# Patient Record
Sex: FEMALE | Race: WHITE | Employment: FULL TIME | URBAN - METROPOLITAN AREA
[De-identification: names, ages, dates, MRNs, and addresses within clinical notes are randomized per-mention and may not be internally consistent; named-entity substitution may affect disease eponyms.]

---

## 2022-10-20 ENCOUNTER — HOSPITAL ENCOUNTER (EMERGENCY)
Age: 62
Discharge: HOME | End: 2022-10-20
Attending: EMERGENCY MEDICINE
Payer: COMMERCIAL

## 2022-10-20 VITALS
TEMPERATURE: 98.1 F | DIASTOLIC BLOOD PRESSURE: 77 MMHG | HEIGHT: 66 IN | WEIGHT: 134.04 LBS | RESPIRATION RATE: 18 BRPM | HEART RATE: 62 BPM | BODY MASS INDEX: 21.54 KG/M2 | SYSTOLIC BLOOD PRESSURE: 159 MMHG | OXYGEN SATURATION: 99 %

## 2022-10-20 DIAGNOSIS — S46.211A RUPTURE OF RIGHT BICEPS TENDON, INITIAL ENCOUNTER: Primary | ICD-10-CM

## 2022-10-20 PROCEDURE — 99283 EMERGENCY DEPT VISIT LOW MDM: CPT

## 2022-10-20 RX ORDER — ACETAMINOPHEN 500 MG
1000 TABLET ORAL
Qty: 20 TABLET | Refills: 0 | Status: SHIPPED | OUTPATIENT
Start: 2022-10-20

## 2022-10-21 NOTE — ED PROVIDER NOTES
The history is provided by the patient. Shoulder Injury   The incident occurred yesterday. The incident occurred at home. Injury mechanism: moved her arm in the bath and felt a pop then had selling and bruising. The right shoulder is affected. The pain is moderate. The pain has been Constant since onset. There is A history of shoulder injury (rotator cuff pathology previously). She has No other injuries. Past Medical History:   Diagnosis Date    Depression     Heart murmur     Hernia     Stress        Past Surgical History:   Procedure Laterality Date    HX GYN  1987    c section    HX GYN  1988    tubal ligation    HX OTHER SURGICAL  10/18/13    femerol hernia repair by         Family History:   Problem Relation Age of Onset    Hypertension Mother     Diabetes Father     Heart Disease Father     Hypertension Father     Cancer Sister     Hypertension Brother     Heart Disease Brother        Social History     Socioeconomic History    Marital status:      Spouse name: Not on file    Number of children: Not on file    Years of education: Not on file    Highest education level: Not on file   Occupational History    Not on file   Tobacco Use    Smoking status: Every Day     Packs/day: 1.00     Types: Cigarettes    Smokeless tobacco: Not on file   Substance and Sexual Activity    Alcohol use: Yes     Comment: occasional    Drug use: Not on file    Sexual activity: Not on file   Other Topics Concern    Not on file   Social History Narrative    Not on file     Social Determinants of Health     Financial Resource Strain: Not on file   Food Insecurity: Not on file   Transportation Needs: Not on file   Physical Activity: Not on file   Stress: Not on file   Social Connections: Not on file   Intimate Partner Violence: Not on file   Housing Stability: Not on file         ALLERGIES: Patient has no known allergies. Review of Systems   All other systems reviewed and are negative.     Vitals:    10/20/22 2226 BP: (!) 159/77   Pulse: 62   Resp: 18   Temp: 98.1 °F (36.7 °C)   SpO2: 99%   Weight: 60.8 kg (134 lb 0.6 oz)   Height: 5' 6\" (1.676 m)            Physical Exam  Vitals and nursing note reviewed. Constitutional:       General: She is not in acute distress. Appearance: She is well-developed. HENT:      Head: Normocephalic and atraumatic. Eyes:      Conjunctiva/sclera: Conjunctivae normal.   Cardiovascular:      Rate and Rhythm: Normal rate and regular rhythm. Pulmonary:      Effort: Pulmonary effort is normal. No respiratory distress. Abdominal:      General: There is no distension. Musculoskeletal:         General: No deformity. Normal range of motion. Cervical back: Neck supple. Comments: Proximal biceps defect on right with a divot and balling up of muscle distally. Large area of ecchymosis and swelling over right upper medial arm   Skin:     General: Skin is warm and dry. Neurological:      Mental Status: She is alert. Cranial Nerves: No cranial nerve deficit. Psychiatric:         Behavior: Behavior normal.        MDM       65 y.o. female presents with right arm swelling and bruising with evidence of right biceps tendon rupture proximally. She has had shoulder issues before. She is anticoagulated for atrial fibrillation and this does not appear consistent with thrombosis. Bedside ultrasound confirms tendon defect with free fluid in the sheath. Placed in sling for comfort and supportive care measures until follow up with orthopedics to consider surgical intervention.      Procedures

## 2022-10-21 NOTE — ED NOTES
Pt discharged in stable condition at this time. MD/KAUSHIK reviewed discharge instructions, prescriptions, and follow up with patient at bedside. Pt verbalized understanding and denies any needs or questions at this time. Pt NAD ambulatory on DC with sling. MD bedside ultrasound to verify no clots.

## 2022-10-21 NOTE — ED TRIAGE NOTES
Emergency Room Nursing Note        Patient Name: Sofia Page      : 1960             MRN: [id number]      Chief Complaint:  Skin Problem and Blood Clot      Admit Diagnosis: No admission diagnoses are documented for this encounter. Admitting Provider: No admitting provider for patient encounter. Surgery: * No surgery found *           Patient arrived to the ER ambulatory from [facility] with complaints of a Rt Arm Bruising that started yesterday while she was taking a bath, she felt a pop. Pt states a Hx of A-fib and is on Xarelto, and a Hx of a Left Atrium Clot.          Lines:        Signed by: Judy Irwin RN, DION, BSN, [facility]                                              10/20/2022 at 10:24 PM